# Patient Record
Sex: MALE | Race: OTHER | URBAN - METROPOLITAN AREA
[De-identification: names, ages, dates, MRNs, and addresses within clinical notes are randomized per-mention and may not be internally consistent; named-entity substitution may affect disease eponyms.]

---

## 2018-11-06 ENCOUNTER — EMERGENCY (EMERGENCY)
Facility: HOSPITAL | Age: 23
LOS: 1 days | Discharge: HOME | End: 2018-11-06
Admitting: EMERGENCY MEDICINE
Payer: COMMERCIAL

## 2018-11-06 VITALS
RESPIRATION RATE: 17 BRPM | OXYGEN SATURATION: 97 % | TEMPERATURE: 98 F | HEART RATE: 57 BPM | DIASTOLIC BLOOD PRESSURE: 79 MMHG | SYSTOLIC BLOOD PRESSURE: 137 MMHG | WEIGHT: 217.38 LBS
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
## 2018-11-06 DIAGNOSIS — S09.90XA UNSPECIFIED INJURY OF HEAD, INITIAL ENCOUNTER: ICD-10-CM
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
## 2018-11-06 DIAGNOSIS — S01.01XA LACERATION WITHOUT FOREIGN BODY OF SCALP, INITIAL ENCOUNTER: ICD-10-CM
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
## 2018-11-06 PROCEDURE — 99283 EMERGENCY DEPT VISIT LOW MDM: CPT

## 2018-11-06 NOTE — ED PROVIDER NOTE - CHPI ED SYMPTOMS NEG
no dizziness/no change in level of consciousness/no blurred vision/no vomiting/no weakness/no confusion/no loss of consciousness/no nausea

## 2018-11-06 NOTE — ED PROVIDER NOTE - MEDICAL DECISION MAKING DETAILS
ED course unremarkable - afebrile and hemodynamically stable. Non-focal neurologic exam. No midline spinal ttp. healing 0.5 cm laceration over right frontal scalp. Tdap up to date. Low suspicion for ICH and will defer CTH d/t negative [place] CTH criteria. Discussed with patient and given strict head injury return precautions. Offered tylenol which he declined. Will f/u with PMD.

## 2018-11-06 NOTE — ED PROVIDER NOTE - OBJECTIVE STATEMENT
24yo otherwise healthy male presents after head strike yesterday with HA and mild photophobia. Pt states he slipped while getting out of the shower and fell forward, striking his head on the door. Reports small laceration over right frontal scalp that bled but resolved after holding pressure. He denies LOC. He was ambulatory after the event. Came today because he has had a prior concussion and wanted to be evaluated. He reports mild frontal HA associated with photophobia. Took advil prior to arrival. HA has not changed in nature since yesterday. He denies double vision, neck pain, numbness or weakness, vomiting, AC use. Tdap up to date. Prior concussion 3yrs ago while playing flag football, no imaging performed at that time.

## 2018-11-06 NOTE — ED ADULT NURSE NOTE - CHPI ED NUR SYMPTOMS NEG
no abrasion/no numbness/no loss of consciousness/no tingling/no vomiting/no fever/no bleeding/no confusion/no weakness/no deformity

## 2018-11-06 NOTE — ED ADULT NURSE NOTE - OBJECTIVE STATEMENT
slip and fall in the shower yesterday ; no bath mat; denies LOC; not on blood thinners; lac to right top head with hematoma; unknown last tetanus

## 2018-11-06 NOTE — ED ADULT NURSE NOTE - NSIMPLEMENTINTERV_GEN_ALL_ED
Implemented All Fall Risk Interventions:  [place] to call system. Call bell, personal items and telephone within reach. Instruct patient to call for assistance. Room bathroom lighting operational. Non-slip footwear when patient is off stretcher. Physically safe environment: no spills, clutter or unnecessary equipment. Stretcher in lowest position, wheels locked, appropriate side rails in place. Provide visual cue, wrist band, yellow gown, etc. Monitor gait and stability. Monitor for mental status changes and reorient to person, place, and time. Review medications for side effects contributing to fall risk. Reinforce activity limits and safety measures with patient and family.

## 2018-11-06 NOTE — ED PROVIDER NOTE - PHYSICAL EXAMINATION
VITAL SIGNS: I have reviewed nursing notes and confirm.  CONSTITUTIONAL: Well-developed; well-nourished; in no acute distress.   SKIN:  warm and dry, no acute rash.   HEAD:  normocephalic, 0.5 cm healing laceration over right frontal scalp without active bleeding, hematoma, ttp. no evidence of facial trauma.   EYES: PERRL, EOM intact; conjunctiva and sclera clear.  ENT: No nasal discharge; airway clear.   NECK: Supple; non tender.  BACK: No midline ttp  CARD: S1, S2 normal; no murmurs, gallops, or rubs. Regular rate and rhythm.   RESP:  Clear to auscultation b/l, no wheezes, rales or rhonchi.  ABD: Normal bowel sounds; soft; non-distended; non-tender; no guarding/ rebound.  EXT: Normal ROM. No clubbing, cyanosis or edema. 2+ pulses to b/l ue/le.  NEURO: Alert, oriented, grossly unremarkable. CN II-XII intact. 5/5 strength in all extremities. Sensation equal and intact. FNF intact. Non-ataxic gait.   PSYCH: Cooperative, mood and affect appropriate.

## 2018-11-06 NOTE — ED PROVIDER NOTE - NS ED ROS FT
Constitutional: No fever. No chills.  Eyes: No redness. No discharge. No vision change.   ENT: No sore throat. No ear pain.  Cardiovascular: No chest pain. No leg swelling.  Respiratory: No cough. No shortness of breath.  GI: No abdominal pain. No vomiting. No diarrhea.   MSK: No joint pain. No back pain.   Skin: No rash. No abrasions. +laceration.  Neuro: No numbness. No weakness. +head strike.  Psych: No known mental health issues.